# Patient Record
Sex: FEMALE | Race: WHITE | URBAN - METROPOLITAN AREA
[De-identification: names, ages, dates, MRNs, and addresses within clinical notes are randomized per-mention and may not be internally consistent; named-entity substitution may affect disease eponyms.]

---

## 2022-11-10 LAB
HEP B, EXTERNAL RESULT: NEGATIVE
RUBELLA TITER, EXTERNAL RESULT: NORMAL

## 2022-11-11 LAB
ABO, EXTERNAL RESULT: NORMAL
HEPATITIS C ANTIBODY, EXTERNAL RESULT: NEGATIVE
HIV, EXTERNAL RESULT: NON REACTIVE
RPR, EXTERNAL RESULT: NON REACTIVE

## 2023-05-24 LAB — GBS, EXTERNAL RESULT: NEGATIVE

## 2023-05-25 ENCOUNTER — HOSPITAL ENCOUNTER (INPATIENT)
Facility: HOSPITAL | Age: 28
LOS: 4 days | Discharge: HOME | End: 2023-05-29
Attending: OBSTETRICS & GYNECOLOGY | Admitting: OBSTETRICS & GYNECOLOGY

## 2023-05-25 PROBLEM — K83.1 CHOLESTASIS: Status: ACTIVE | Noted: 2023-05-25

## 2023-05-25 LAB
ALBUMIN SERPL-MCNC: 2.7 G/DL (ref 3.5–5)
ALBUMIN/GLOB SERPL: 0.8 (ref 1.1–2.2)
ALP SERPL-CCNC: 301 U/L (ref 45–117)
ALT SERPL-CCNC: 167 U/L (ref 12–78)
ANION GAP SERPL CALC-SCNC: 9 MMOL/L (ref 5–15)
APPEARANCE UR: CLEAR
AST SERPL-CCNC: 120 U/L (ref 15–37)
BACTERIA URNS QL MICRO: ABNORMAL /HPF
BILIRUB SERPL-MCNC: 0.8 MG/DL (ref 0.2–1)
BILIRUB UR QL: NEGATIVE
BUN SERPL-MCNC: 6 MG/DL (ref 6–20)
BUN/CREAT SERPL: 7 (ref 12–20)
CALCIUM SERPL-MCNC: 8.8 MG/DL (ref 8.5–10.1)
CHLORIDE SERPL-SCNC: 110 MMOL/L (ref 97–108)
CO2 SERPL-SCNC: 19 MMOL/L (ref 21–32)
COLOR UR: ABNORMAL
CREAT SERPL-MCNC: 0.84 MG/DL (ref 0.55–1.02)
CREAT UR-MCNC: 13.2 MG/DL
EPITH CASTS URNS QL MICRO: ABNORMAL /LPF
ERYTHROCYTE [DISTWIDTH] IN BLOOD BY AUTOMATED COUNT: 11.8 % (ref 11.5–14.5)
GLOBULIN SER CALC-MCNC: 3.5 G/DL (ref 2–4)
GLUCOSE SERPL-MCNC: 87 MG/DL (ref 65–100)
GLUCOSE UR STRIP.AUTO-MCNC: NEGATIVE MG/DL
HCT VFR BLD AUTO: 36.3 % (ref 35–47)
HGB BLD-MCNC: 11.8 G/DL (ref 11.5–16)
HGB UR QL STRIP: NEGATIVE
KETONES UR QL STRIP.AUTO: NEGATIVE MG/DL
LEUKOCYTE ESTERASE UR QL STRIP.AUTO: ABNORMAL
MCH RBC QN AUTO: 27.4 PG (ref 26–34)
MCHC RBC AUTO-ENTMCNC: 32.5 G/DL (ref 30–36.5)
MCV RBC AUTO: 84.2 FL (ref 80–99)
NITRITE UR QL STRIP.AUTO: NEGATIVE
NRBC # BLD: 0 K/UL (ref 0–0.01)
NRBC BLD-RTO: 0 PER 100 WBC
PH UR STRIP: 6.5 (ref 5–8)
PLATELET # BLD AUTO: 224 K/UL (ref 150–400)
POTASSIUM SERPL-SCNC: 3.9 MMOL/L (ref 3.5–5.1)
PROT SERPL-MCNC: 6.2 G/DL (ref 6.4–8.2)
PROT UR STRIP-MCNC: NEGATIVE MG/DL
PROT UR-MCNC: 6 MG/DL (ref 0–11.9)
PROT/CREAT UR-RTO: 0.5
RBC # BLD AUTO: 4.31 M/UL (ref 3.8–5.2)
RBC #/AREA URNS HPF: ABNORMAL /HPF (ref 0–5)
SODIUM SERPL-SCNC: 138 MMOL/L (ref 136–145)
SP GR UR REFRACTOMETRY: <1.005 (ref 1–1.03)
URINE CULTURE IF INDICATED: ABNORMAL
UROBILINOGEN UR QL STRIP.AUTO: 0.2 EU/DL (ref 0.2–1)
WBC # BLD AUTO: 8.9 K/UL (ref 3.6–11)
WBC URNS QL MICRO: ABNORMAL /HPF (ref 0–4)

## 2023-05-25 PROCEDURE — 3E033VJ INTRODUCTION OF OTHER HORMONE INTO PERIPHERAL VEIN, PERCUTANEOUS APPROACH: ICD-10-PCS | Performed by: OBSTETRICS & GYNECOLOGY

## 2023-05-25 PROCEDURE — 82570 ASSAY OF URINE CREATININE: CPT

## 2023-05-25 PROCEDURE — 4500000201 HC EMERGENCY DEPT VISIT NO LEVEL OF CARE

## 2023-05-25 PROCEDURE — 85027 COMPLETE CBC AUTOMATED: CPT

## 2023-05-25 PROCEDURE — 1100000000 HC RM PRIVATE

## 2023-05-25 PROCEDURE — 6360000002 HC RX W HCPCS: Performed by: OBSTETRICS & GYNECOLOGY

## 2023-05-25 PROCEDURE — 80053 COMPREHEN METABOLIC PANEL: CPT

## 2023-05-25 PROCEDURE — 4A1HXCZ MONITORING OF PRODUCTS OF CONCEPTION, CARDIAC RATE, EXTERNAL APPROACH: ICD-10-PCS | Performed by: OBSTETRICS & GYNECOLOGY

## 2023-05-25 PROCEDURE — 99283 EMERGENCY DEPT VISIT LOW MDM: CPT

## 2023-05-25 PROCEDURE — 10907ZC DRAINAGE OF AMNIOTIC FLUID, THERAPEUTIC FROM PRODUCTS OF CONCEPTION, VIA NATURAL OR ARTIFICIAL OPENING: ICD-10-PCS | Performed by: OBSTETRICS & GYNECOLOGY

## 2023-05-25 PROCEDURE — 6370000000 HC RX 637 (ALT 250 FOR IP): Performed by: OBSTETRICS & GYNECOLOGY

## 2023-05-25 PROCEDURE — 84156 ASSAY OF PROTEIN URINE: CPT

## 2023-05-25 PROCEDURE — 81001 URINALYSIS AUTO W/SCOPE: CPT

## 2023-05-25 PROCEDURE — 36415 COLL VENOUS BLD VENIPUNCTURE: CPT

## 2023-05-25 RX ORDER — METHYLERGONOVINE MALEATE 0.2 MG/ML
200 INJECTION INTRAVENOUS PRN
Status: DISCONTINUED | OUTPATIENT
Start: 2023-05-25 | End: 2023-05-27

## 2023-05-25 RX ORDER — SODIUM CHLORIDE 0.9 % (FLUSH) 0.9 %
5-40 SYRINGE (ML) INJECTION EVERY 12 HOURS SCHEDULED
Status: DISCONTINUED | OUTPATIENT
Start: 2023-05-25 | End: 2023-05-27

## 2023-05-25 RX ORDER — SODIUM CHLORIDE 9 MG/ML
25 INJECTION, SOLUTION INTRAVENOUS PRN
Status: DISCONTINUED | OUTPATIENT
Start: 2023-05-25 | End: 2023-05-27

## 2023-05-25 RX ORDER — SODIUM CHLORIDE, SODIUM LACTATE, POTASSIUM CHLORIDE, AND CALCIUM CHLORIDE .6; .31; .03; .02 G/100ML; G/100ML; G/100ML; G/100ML
500 INJECTION, SOLUTION INTRAVENOUS PRN
Status: DISCONTINUED | OUTPATIENT
Start: 2023-05-25 | End: 2023-05-27

## 2023-05-25 RX ORDER — HYDROMORPHONE HYDROCHLORIDE 2 MG/ML
2 INJECTION, SOLUTION INTRAMUSCULAR; INTRAVENOUS; SUBCUTANEOUS EVERY 4 HOURS PRN
Status: DISCONTINUED | OUTPATIENT
Start: 2023-05-25 | End: 2023-05-27

## 2023-05-25 RX ORDER — SODIUM CHLORIDE, SODIUM LACTATE, POTASSIUM CHLORIDE, AND CALCIUM CHLORIDE .6; .31; .03; .02 G/100ML; G/100ML; G/100ML; G/100ML
1000 INJECTION, SOLUTION INTRAVENOUS PRN
Status: DISCONTINUED | OUTPATIENT
Start: 2023-05-25 | End: 2023-05-27

## 2023-05-25 RX ORDER — MISOPROSTOL 200 UG/1
800 TABLET ORAL PRN
Status: DISCONTINUED | OUTPATIENT
Start: 2023-05-25 | End: 2023-05-27

## 2023-05-25 RX ORDER — SODIUM CHLORIDE 0.9 % (FLUSH) 0.9 %
5-40 SYRINGE (ML) INJECTION PRN
Status: DISCONTINUED | OUTPATIENT
Start: 2023-05-25 | End: 2023-05-27

## 2023-05-25 RX ORDER — FENTANYL CITRATE 50 UG/ML
50 INJECTION, SOLUTION INTRAMUSCULAR; INTRAVENOUS
Status: DISCONTINUED | OUTPATIENT
Start: 2023-05-25 | End: 2023-05-27

## 2023-05-25 RX ORDER — CARBOPROST TROMETHAMINE 250 UG/ML
250 INJECTION, SOLUTION INTRAMUSCULAR PRN
Status: DISCONTINUED | OUTPATIENT
Start: 2023-05-25 | End: 2023-05-27

## 2023-05-25 RX ORDER — BETAMETHASONE SODIUM PHOSPHATE AND BETAMETHASONE ACETATE 3; 3 MG/ML; MG/ML
12 INJECTION, SUSPENSION INTRA-ARTICULAR; INTRALESIONAL; INTRAMUSCULAR; SOFT TISSUE DAILY
Status: COMPLETED | OUTPATIENT
Start: 2023-05-25 | End: 2023-05-26

## 2023-05-25 RX ORDER — URSODIOL 300 MG/1
300 CAPSULE ORAL 2 TIMES DAILY
Status: ON HOLD | COMMUNITY
End: 2023-05-29 | Stop reason: HOSPADM

## 2023-05-25 RX ORDER — ZOLPIDEM TARTRATE 5 MG/1
5 TABLET ORAL NIGHTLY PRN
Status: DISCONTINUED | OUTPATIENT
Start: 2023-05-25 | End: 2023-05-27

## 2023-05-25 RX ORDER — ONDANSETRON 2 MG/ML
4 INJECTION INTRAMUSCULAR; INTRAVENOUS EVERY 6 HOURS PRN
Status: DISCONTINUED | OUTPATIENT
Start: 2023-05-25 | End: 2023-05-27

## 2023-05-25 RX ORDER — SEVOFLURANE 250 ML/250ML
1 LIQUID RESPIRATORY (INHALATION) CONTINUOUS PRN
Status: DISCONTINUED | OUTPATIENT
Start: 2023-05-25 | End: 2023-05-27

## 2023-05-25 RX ORDER — SODIUM CHLORIDE, SODIUM LACTATE, POTASSIUM CHLORIDE, CALCIUM CHLORIDE 600; 310; 30; 20 MG/100ML; MG/100ML; MG/100ML; MG/100ML
INJECTION, SOLUTION INTRAVENOUS CONTINUOUS
Status: DISCONTINUED | OUTPATIENT
Start: 2023-05-25 | End: 2023-05-27

## 2023-05-25 RX ADMIN — HYDROMORPHONE HYDROCHLORIDE 2 MG: 2 INJECTION, SOLUTION INTRAMUSCULAR; INTRAVENOUS; SUBCUTANEOUS at 22:20

## 2023-05-25 RX ADMIN — BETAMETHASONE SODIUM PHOSPHATE AND BETAMETHASONE ACETATE 12 MG: 3; 3 INJECTION, SUSPENSION INTRA-ARTICULAR; INTRALESIONAL; INTRAMUSCULAR at 16:59

## 2023-05-25 RX ADMIN — ZOLPIDEM TARTRATE 5 MG: 5 TABLET ORAL at 22:20

## 2023-05-25 ASSESSMENT — PAIN DESCRIPTION - LOCATION: LOCATION: ABDOMEN

## 2023-05-25 ASSESSMENT — PAIN SCALES - GENERAL: PAINLEVEL_OUTOF10: 5

## 2023-05-26 ENCOUNTER — ANESTHESIA (OUTPATIENT)
Facility: HOSPITAL | Age: 28
End: 2023-05-26

## 2023-05-26 ENCOUNTER — ANESTHESIA EVENT (OUTPATIENT)
Facility: HOSPITAL | Age: 28
End: 2023-05-26

## 2023-05-26 LAB
ALBUMIN SERPL-MCNC: 3.2 G/DL (ref 3.5–5)
ALBUMIN/GLOB SERPL: 0.7 (ref 1.1–2.2)
ALP SERPL-CCNC: 378 U/L (ref 45–117)
ALT SERPL-CCNC: 246 U/L (ref 12–78)
ANION GAP SERPL CALC-SCNC: 12 MMOL/L (ref 5–15)
AST SERPL-CCNC: 177 U/L (ref 15–37)
BILIRUB SERPL-MCNC: 1.3 MG/DL (ref 0.2–1)
BUN SERPL-MCNC: 10 MG/DL (ref 6–20)
BUN/CREAT SERPL: 9 (ref 12–20)
CALCIUM SERPL-MCNC: 10 MG/DL (ref 8.5–10.1)
CHLORIDE SERPL-SCNC: 103 MMOL/L (ref 97–108)
CO2 SERPL-SCNC: 19 MMOL/L (ref 21–32)
CREAT SERPL-MCNC: 1.09 MG/DL (ref 0.55–1.02)
ERYTHROCYTE [DISTWIDTH] IN BLOOD BY AUTOMATED COUNT: 11.9 % (ref 11.5–14.5)
GLOBULIN SER CALC-MCNC: 4.5 G/DL (ref 2–4)
GLUCOSE SERPL-MCNC: 102 MG/DL (ref 65–100)
HCT VFR BLD AUTO: 42.6 % (ref 35–47)
HGB BLD-MCNC: 13.8 G/DL (ref 11.5–16)
MCH RBC QN AUTO: 27.1 PG (ref 26–34)
MCHC RBC AUTO-ENTMCNC: 32.4 G/DL (ref 30–36.5)
MCV RBC AUTO: 83.7 FL (ref 80–99)
NRBC # BLD: 0 K/UL (ref 0–0.01)
NRBC BLD-RTO: 0 PER 100 WBC
PLATELET # BLD AUTO: 287 K/UL (ref 150–400)
PMV BLD AUTO: 13 FL (ref 8.9–12.9)
POTASSIUM SERPL-SCNC: 4.5 MMOL/L (ref 3.5–5.1)
PROT SERPL-MCNC: 7.7 G/DL (ref 6.4–8.2)
RBC # BLD AUTO: 5.09 M/UL (ref 3.8–5.2)
SODIUM SERPL-SCNC: 134 MMOL/L (ref 136–145)
WBC # BLD AUTO: 14.9 K/UL (ref 3.6–11)

## 2023-05-26 PROCEDURE — 6360000002 HC RX W HCPCS: Performed by: ANESTHESIOLOGY

## 2023-05-26 PROCEDURE — 85027 COMPLETE CBC AUTOMATED: CPT

## 2023-05-26 PROCEDURE — 2500000003 HC RX 250 WO HCPCS: Performed by: OBSTETRICS & GYNECOLOGY

## 2023-05-26 PROCEDURE — 2580000003 HC RX 258

## 2023-05-26 PROCEDURE — 6360000002 HC RX W HCPCS

## 2023-05-26 PROCEDURE — 2500000003 HC RX 250 WO HCPCS

## 2023-05-26 PROCEDURE — 2580000003 HC RX 258: Performed by: OBSTETRICS & GYNECOLOGY

## 2023-05-26 PROCEDURE — 6370000000 HC RX 637 (ALT 250 FOR IP): Performed by: OBSTETRICS & GYNECOLOGY

## 2023-05-26 PROCEDURE — 6360000002 HC RX W HCPCS: Performed by: OBSTETRICS & GYNECOLOGY

## 2023-05-26 PROCEDURE — A4216 STERILE WATER/SALINE, 10 ML: HCPCS | Performed by: OBSTETRICS & GYNECOLOGY

## 2023-05-26 PROCEDURE — 3700000025 EPIDURAL BLOCK: Performed by: STUDENT IN AN ORGANIZED HEALTH CARE EDUCATION/TRAINING PROGRAM

## 2023-05-26 PROCEDURE — 36415 COLL VENOUS BLD VENIPUNCTURE: CPT

## 2023-05-26 PROCEDURE — 1100000000 HC RM PRIVATE

## 2023-05-26 PROCEDURE — 80053 COMPREHEN METABOLIC PANEL: CPT

## 2023-05-26 PROCEDURE — 2500000003 HC RX 250 WO HCPCS: Performed by: ANESTHESIOLOGY

## 2023-05-26 PROCEDURE — 0KQM0ZZ REPAIR PERINEUM MUSCLE, OPEN APPROACH: ICD-10-PCS | Performed by: OBSTETRICS & GYNECOLOGY

## 2023-05-26 RX ORDER — BUPIVACAINE HYDROCHLORIDE 2.5 MG/ML
INJECTION, SOLUTION EPIDURAL; INFILTRATION; INTRACAUDAL PRN
Status: DISCONTINUED | OUTPATIENT
Start: 2023-05-26 | End: 2023-05-27 | Stop reason: SDUPTHER

## 2023-05-26 RX ORDER — NALOXONE HYDROCHLORIDE 0.4 MG/ML
INJECTION, SOLUTION INTRAMUSCULAR; INTRAVENOUS; SUBCUTANEOUS PRN
Status: DISCONTINUED | OUTPATIENT
Start: 2023-05-26 | End: 2023-05-27

## 2023-05-26 RX ORDER — FENTANYL 0.2 MG/100ML-BUPIV 0.125%-NACL 0.9% EPIDURAL INJ 2/0.125 MCG/ML-%
SOLUTION INJECTION
Status: COMPLETED
Start: 2023-05-26 | End: 2023-05-26

## 2023-05-26 RX ORDER — FENTANYL 0.2 MG/100ML-BUPIV 0.125%-NACL 0.9% EPIDURAL INJ 2/0.125 MCG/ML-%
1-15 SOLUTION INJECTION CONTINUOUS
Status: DISCONTINUED | OUTPATIENT
Start: 2023-05-26 | End: 2023-05-27

## 2023-05-26 RX ORDER — EPHEDRINE SULFATE 50 MG/ML
INJECTION INTRAVENOUS
Status: DISCONTINUED
Start: 2023-05-26 | End: 2023-05-27 | Stop reason: WASHOUT

## 2023-05-26 RX ORDER — ONDANSETRON 2 MG/ML
4 INJECTION INTRAMUSCULAR; INTRAVENOUS EVERY 6 HOURS PRN
Status: DISCONTINUED | OUTPATIENT
Start: 2023-05-26 | End: 2023-05-27

## 2023-05-26 RX ORDER — DIPHENHYDRAMINE HYDROCHLORIDE 50 MG/ML
12.5 INJECTION INTRAMUSCULAR; INTRAVENOUS EVERY 4 HOURS PRN
Status: DISCONTINUED | OUTPATIENT
Start: 2023-05-26 | End: 2023-05-27

## 2023-05-26 RX ADMIN — SODIUM CHLORIDE, POTASSIUM CHLORIDE, SODIUM LACTATE AND CALCIUM CHLORIDE: 600; 310; 30; 20 INJECTION, SOLUTION INTRAVENOUS at 21:40

## 2023-05-26 RX ADMIN — BETAMETHASONE SODIUM PHOSPHATE AND BETAMETHASONE ACETATE 12 MG: 3; 3 INJECTION, SUSPENSION INTRA-ARTICULAR; INTRALESIONAL; INTRAMUSCULAR at 17:08

## 2023-05-26 RX ADMIN — SODIUM CHLORIDE, POTASSIUM CHLORIDE, SODIUM LACTATE AND CALCIUM CHLORIDE 1000 ML: 600; 310; 30; 20 INJECTION, SOLUTION INTRAVENOUS at 17:25

## 2023-05-26 RX ADMIN — ONDANSETRON 4 MG: 2 INJECTION INTRAMUSCULAR; INTRAVENOUS at 23:50

## 2023-05-26 RX ADMIN — FAMOTIDINE 20 MG: 10 INJECTION, SOLUTION INTRAVENOUS at 23:50

## 2023-05-26 RX ADMIN — BUPIVACAINE HYDROCHLORIDE 8 ML: 2.5 INJECTION, SOLUTION EPIDURAL; INFILTRATION; INTRACAUDAL; PERINEURAL at 18:09

## 2023-05-26 RX ADMIN — OXYTOCIN 1 MILLI-UNITS/MIN: 10 INJECTION, SOLUTION INTRAMUSCULAR; INTRAVENOUS at 21:36

## 2023-05-26 RX ADMIN — Medication 25 MCG: at 11:07

## 2023-05-26 RX ADMIN — HYDROMORPHONE HYDROCHLORIDE 2 MG: 2 INJECTION, SOLUTION INTRAMUSCULAR; INTRAVENOUS; SUBCUTANEOUS at 02:00

## 2023-05-26 RX ADMIN — FENTANYL CITRATE 10 ML/HR: 0.05 INJECTION, SOLUTION INTRAMUSCULAR; INTRAVENOUS at 18:11

## 2023-05-26 RX ADMIN — Medication 50 MCG: at 06:57

## 2023-05-26 RX ADMIN — FENTANYL 0.2 MG/100ML-BUPIV 0.125%-NACL 0.9% EPIDURAL INJ 10 ML/HR: 2/0.125 SOLUTION at 18:11

## 2023-05-26 ASSESSMENT — PAIN SCALES - GENERAL: PAINLEVEL_OUTOF10: 5

## 2023-05-26 NOTE — ANESTHESIA PRE PROCEDURE
Department of Anesthesiology  Preprocedure Note       Name:  Augustina Santana   Age:  28 y.o.  :  1995                                          MRN:  [id number]         Date:  2023      Surgeon: * No surgeons listed *    Procedure: * No procedures listed *    Medications prior to admission:   Prior to Admission medications    Medication Sig Start Date End Date Taking?  Authorizing Provider   ursodiol (ACTIGALL) 300 MG capsule Take 1 capsule by mouth 2 times daily   Yes Historical Provider, MD   Prenatal Multivit-Min-Fe-FA (PRENATAL 1 + IRON PO) Take by mouth   Yes Historical Provider, MD       Current medications:    Current Facility-Administered Medications   Medication Dose Route Frequency Provider Last Rate Last Admin    oxytocin (PITOCIN) 30 units in 500 mL infusion  1-20 karlee-units/min IntraVENous Continuous Any Lisa MD        lactated ringers IV soln infusion   IntraVENous Continuous Trinity Moise MD        lactated ringers bolus  500 mL IntraVENous PRN Trinity Moise MD        Or    lactated ringers bolus  1,000 mL IntraVENous PRN Trinity Moise MD        sodium chloride flush 0.9 % injection 5-40 mL  5-40 mL IntraVENous 2 times per day Trinity Moise MD        sodium chloride flush 0.9 % injection 5-40 mL  5-40 mL IntraVENous PRN Trinity Moise MD        0.9 % sodium chloride infusion  25 mL IntraVENous PRN Trinity Moise MD        methylergonovine (METHERGINE) injection 200 mcg  200 mcg IntraMUSCular PRN Trinity Moise MD        carboprost (HEMABATE) injection 250 mcg  250 mcg IntraMUSCular PRN Trinity Moise MD        miSOPROStol (CYTOTEC) tablet 800 mcg  800 mcg Rectal PRN Trinity Moise MD        oxytocin (PITOCIN) 30 units in 500 mL infusion  87.3 karlee-units/min IntraVENous Continuous PRN Trinity Moise MD        And    oxytocin (PITOCIN) 10 unit bolus from the bag  10 Units IntraVENous PRN Trinity Moise MD        ondansetron [facility]) injection 4 mg

## 2023-05-26 NOTE — ANESTHESIA PROCEDURE NOTES
Epidural Block    Patient location during procedure: OB  Start time: 5/26/2023 5:55 PM  End time: 5/26/2023 6:05 PM  Reason for block: labor epidural  Staffing  Performed: anesthesiologist   Anesthesiologist: Silvina Gabriel MD  Epidural  Patient position: left lateral decubitus  Prep: DuraPrep  Patient monitoring: cardiac monitor, continuous pulse ox and frequent blood pressure checks  Approach: midline  Location: L3-4  Injection technique: LYNDSAY air  Provider prep: mask and sterile gloves  Needle  Needle type: Tuohy   Needle gauge: 17 G  Needle length: 3.5 in  Needle insertion depth: 7 cm  Catheter type: end hole  Catheter size: 25 G  Catheter at skin depth: 12 cmCatheter Secured: tegaderm and tape  Assessment  Sensory level: T6  Events: None  Hemodynamics: stable  Attempts: 1  Outcomes: uncomplicated and patient tolerated procedure well  Preanesthetic Checklist  Completed: patient identified, IV checked, site marked, risks and benefits discussed, surgical/procedural consents, equipment checked, pre-op evaluation, timeout performed, anesthesia consent given, oxygen available, monitors applied/VS acknowledged and fire risk safety assessment completed and verbalized

## 2023-05-27 LAB
ALBUMIN SERPL-MCNC: 2.3 G/DL (ref 3.5–5)
ALBUMIN/GLOB SERPL: 0.5 (ref 1.1–2.2)
ALP SERPL-CCNC: 257 U/L (ref 45–117)
ALT SERPL-CCNC: 290 U/L (ref 12–78)
ANION GAP SERPL CALC-SCNC: 9 MMOL/L (ref 5–15)
AST SERPL-CCNC: 225 U/L (ref 15–37)
BILIRUB SERPL-MCNC: 0.7 MG/DL (ref 0.2–1)
BUN SERPL-MCNC: 13 MG/DL (ref 6–20)
BUN/CREAT SERPL: 9 (ref 12–20)
CALCIUM SERPL-MCNC: 9.1 MG/DL (ref 8.5–10.1)
CHLORIDE SERPL-SCNC: 108 MMOL/L (ref 97–108)
CO2 SERPL-SCNC: 22 MMOL/L (ref 21–32)
CREAT SERPL-MCNC: 1.37 MG/DL (ref 0.55–1.02)
GLOBULIN SER CALC-MCNC: 4.3 G/DL (ref 2–4)
GLUCOSE SERPL-MCNC: 126 MG/DL (ref 65–100)
POTASSIUM SERPL-SCNC: 4 MMOL/L (ref 3.5–5.1)
PROT SERPL-MCNC: 6.6 G/DL (ref 6.4–8.2)
SODIUM SERPL-SCNC: 139 MMOL/L (ref 136–145)

## 2023-05-27 PROCEDURE — 80053 COMPREHEN METABOLIC PANEL: CPT

## 2023-05-27 PROCEDURE — 6370000000 HC RX 637 (ALT 250 FOR IP): Performed by: OBSTETRICS & GYNECOLOGY

## 2023-05-27 PROCEDURE — 36415 COLL VENOUS BLD VENIPUNCTURE: CPT

## 2023-05-27 PROCEDURE — 7100000000 HC PACU RECOVERY - FIRST 15 MIN

## 2023-05-27 PROCEDURE — 3700000156 HC EPIDURAL ANESTHESIA

## 2023-05-27 PROCEDURE — 1120000000 HC RM PRIVATE OB

## 2023-05-27 PROCEDURE — 7210000100 HC LABOR FEE PER 1 HR

## 2023-05-27 PROCEDURE — 7220000101 HC DELIVERY VAGINAL/SINGLE

## 2023-05-27 RX ORDER — SODIUM CHLORIDE 0.9 % (FLUSH) 0.9 %
5-40 SYRINGE (ML) INJECTION EVERY 12 HOURS SCHEDULED
Status: DISCONTINUED | OUTPATIENT
Start: 2023-05-27 | End: 2023-05-29 | Stop reason: HOSPADM

## 2023-05-27 RX ORDER — SODIUM CHLORIDE 0.9 % (FLUSH) 0.9 %
5-40 SYRINGE (ML) INJECTION PRN
Status: DISCONTINUED | OUTPATIENT
Start: 2023-05-27 | End: 2023-05-29 | Stop reason: HOSPADM

## 2023-05-27 RX ORDER — SODIUM CHLORIDE, SODIUM LACTATE, POTASSIUM CHLORIDE, CALCIUM CHLORIDE 600; 310; 30; 20 MG/100ML; MG/100ML; MG/100ML; MG/100ML
INJECTION, SOLUTION INTRAVENOUS CONTINUOUS
Status: DISCONTINUED | OUTPATIENT
Start: 2023-05-27 | End: 2023-05-29 | Stop reason: HOSPADM

## 2023-05-27 RX ORDER — DOCUSATE SODIUM 100 MG/1
100 CAPSULE, LIQUID FILLED ORAL 2 TIMES DAILY
Status: DISCONTINUED | OUTPATIENT
Start: 2023-05-27 | End: 2023-05-29 | Stop reason: HOSPADM

## 2023-05-27 RX ORDER — MODIFIED LANOLIN
OINTMENT (GRAM) TOPICAL PRN
Status: DISCONTINUED | OUTPATIENT
Start: 2023-05-27 | End: 2023-05-29 | Stop reason: HOSPADM

## 2023-05-27 RX ORDER — SODIUM CHLORIDE 9 MG/ML
INJECTION, SOLUTION INTRAVENOUS PRN
Status: DISCONTINUED | OUTPATIENT
Start: 2023-05-27 | End: 2023-05-29 | Stop reason: HOSPADM

## 2023-05-27 RX ORDER — OXYCODONE HYDROCHLORIDE 5 MG/1
10 TABLET ORAL EVERY 4 HOURS PRN
Status: DISCONTINUED | OUTPATIENT
Start: 2023-05-27 | End: 2023-05-29 | Stop reason: HOSPADM

## 2023-05-27 RX ORDER — OXYCODONE HYDROCHLORIDE 5 MG/1
5 TABLET ORAL EVERY 4 HOURS PRN
Status: DISCONTINUED | OUTPATIENT
Start: 2023-05-27 | End: 2023-05-29 | Stop reason: HOSPADM

## 2023-05-27 RX ORDER — ACETAMINOPHEN 500 MG
1000 TABLET ORAL EVERY 8 HOURS SCHEDULED
Status: DISCONTINUED | OUTPATIENT
Start: 2023-05-27 | End: 2023-05-29 | Stop reason: HOSPADM

## 2023-05-27 RX ORDER — IBUPROFEN 400 MG/1
800 TABLET ORAL EVERY 8 HOURS SCHEDULED
Status: DISCONTINUED | OUTPATIENT
Start: 2023-05-27 | End: 2023-05-29 | Stop reason: HOSPADM

## 2023-05-27 RX ADMIN — DOCUSATE SODIUM 100 MG: 100 CAPSULE, LIQUID FILLED ORAL at 22:29

## 2023-05-27 RX ADMIN — Medication 909 ML: at 01:24

## 2023-05-27 RX ADMIN — ACETAMINOPHEN 1000 MG: 500 TABLET ORAL at 17:17

## 2023-05-27 RX ADMIN — IBUPROFEN 800 MG: 400 TABLET, FILM COATED ORAL at 19:51

## 2023-05-27 RX ADMIN — IBUPROFEN 800 MG: 400 TABLET, FILM COATED ORAL at 12:06

## 2023-05-27 RX ADMIN — IBUPROFEN 800 MG: 400 TABLET, FILM COATED ORAL at 02:58

## 2023-05-27 RX ADMIN — DOCUSATE SODIUM 100 MG: 100 CAPSULE, LIQUID FILLED ORAL at 08:46

## 2023-05-27 RX ADMIN — ACETAMINOPHEN 1000 MG: 500 TABLET ORAL at 08:46

## 2023-05-27 ASSESSMENT — PAIN SCALES - GENERAL
PAINLEVEL_OUTOF10: 2
PAINLEVEL_OUTOF10: 0
PAINLEVEL_OUTOF10: 3
PAINLEVEL_OUTOF10: 2

## 2023-05-27 ASSESSMENT — PAIN DESCRIPTION - LOCATION
LOCATION: PERINEUM

## 2023-05-27 ASSESSMENT — PAIN DESCRIPTION - DESCRIPTORS
DESCRIPTORS: DULL;SORE
DESCRIPTORS: SORE
DESCRIPTORS: SORE

## 2023-05-27 ASSESSMENT — PAIN DESCRIPTION - ORIENTATION: ORIENTATION: LOWER

## 2023-05-27 NOTE — ANESTHESIA POSTPROCEDURE EVALUATION
Department of Anesthesiology  Postprocedure Note    Patient: Annel Armenta  MRN: [id number]  YOB: 1995  Date of evaluation: 5/27/2023      Procedure Summary     Date: 05/26/23 Room / Location:     Anesthesia Start: 1755 Anesthesia Stop: 05/27/23 0118    Procedure: Labor Analgesia Diagnosis:     Scheduled Providers:  Responsible Provider: Rae Quevedo DO    Anesthesia Type: epidural ASA Status: 2          Anesthesia Type: No value filed.     Jaquelin Phase I:      Jaquelin Phase II:        Anesthesia Post Evaluation    Patient location during evaluation: bedside  Patient participation: complete - patient participated  Level of consciousness: awake  Pain score: 0  Airway patency: patent  Nausea & Vomiting: no nausea and no vomiting  Complications: no  Cardiovascular status: blood pressure returned to baseline  Respiratory status: acceptable  Hydration status: euvolemic  Comments: /65   Pulse 79   Temp 98.9 °F (37.2 °C) (Axillary)   Resp 18   Ht 1.626 m (5' 4\")   Wt 92.1 kg (203 lb)   SpO2 99%   BMI 34.84 kg/m²

## 2023-05-28 LAB
ALBUMIN SERPL-MCNC: 2.2 G/DL (ref 3.5–5)
ALBUMIN/GLOB SERPL: 0.6 (ref 1.1–2.2)
ALP SERPL-CCNC: 208 U/L (ref 45–117)
ALT SERPL-CCNC: 182 U/L (ref 12–78)
ANION GAP SERPL CALC-SCNC: 6 MMOL/L (ref 5–15)
AST SERPL-CCNC: 90 U/L (ref 15–37)
BILIRUB SERPL-MCNC: 0.3 MG/DL (ref 0.2–1)
BUN SERPL-MCNC: 11 MG/DL (ref 6–20)
BUN/CREAT SERPL: 11 (ref 12–20)
CALCIUM SERPL-MCNC: 8.5 MG/DL (ref 8.5–10.1)
CHLORIDE SERPL-SCNC: 109 MMOL/L (ref 97–108)
CO2 SERPL-SCNC: 25 MMOL/L (ref 21–32)
CREAT SERPL-MCNC: 1.01 MG/DL (ref 0.55–1.02)
GLOBULIN SER CALC-MCNC: 3.8 G/DL (ref 2–4)
GLUCOSE SERPL-MCNC: 88 MG/DL (ref 65–100)
POTASSIUM SERPL-SCNC: 3.6 MMOL/L (ref 3.5–5.1)
PROT SERPL-MCNC: 6 G/DL (ref 6.4–8.2)
SODIUM SERPL-SCNC: 140 MMOL/L (ref 136–145)

## 2023-05-28 PROCEDURE — 80053 COMPREHEN METABOLIC PANEL: CPT

## 2023-05-28 PROCEDURE — 36415 COLL VENOUS BLD VENIPUNCTURE: CPT

## 2023-05-28 PROCEDURE — 6370000000 HC RX 637 (ALT 250 FOR IP): Performed by: OBSTETRICS & GYNECOLOGY

## 2023-05-28 PROCEDURE — 1120000000 HC RM PRIVATE OB

## 2023-05-28 RX ADMIN — DOCUSATE SODIUM 100 MG: 100 CAPSULE, LIQUID FILLED ORAL at 09:09

## 2023-05-28 RX ADMIN — ACETAMINOPHEN 1000 MG: 500 TABLET ORAL at 09:09

## 2023-05-28 RX ADMIN — IBUPROFEN 800 MG: 400 TABLET, FILM COATED ORAL at 12:28

## 2023-05-28 RX ADMIN — IBUPROFEN 800 MG: 400 TABLET, FILM COATED ORAL at 20:27

## 2023-05-28 RX ADMIN — ACETAMINOPHEN 1000 MG: 500 TABLET ORAL at 18:13

## 2023-05-28 RX ADMIN — IBUPROFEN 800 MG: 400 TABLET, FILM COATED ORAL at 03:48

## 2023-05-28 RX ADMIN — DOCUSATE SODIUM 100 MG: 100 CAPSULE, LIQUID FILLED ORAL at 20:27

## 2023-05-28 ASSESSMENT — PAIN DESCRIPTION - LOCATION
LOCATION: PERINEUM
LOCATION: PERINEUM
LOCATION: ABDOMEN
LOCATION: PERINEUM

## 2023-05-28 ASSESSMENT — PAIN SCALES - GENERAL
PAINLEVEL_OUTOF10: 2
PAINLEVEL_OUTOF10: 3
PAINLEVEL_OUTOF10: 3
PAINLEVEL_OUTOF10: 2

## 2023-05-28 ASSESSMENT — PAIN DESCRIPTION - ORIENTATION
ORIENTATION: LOWER
ORIENTATION: LOWER

## 2023-05-28 ASSESSMENT — PAIN DESCRIPTION - DESCRIPTORS
DESCRIPTORS: SORE
DESCRIPTORS: CRAMPING
DESCRIPTORS: SORE
DESCRIPTORS: DISCOMFORT

## 2023-05-29 VITALS
HEIGHT: 64 IN | BODY MASS INDEX: 34.66 KG/M2 | OXYGEN SATURATION: 100 % | TEMPERATURE: 98 F | DIASTOLIC BLOOD PRESSURE: 79 MMHG | WEIGHT: 203 LBS | SYSTOLIC BLOOD PRESSURE: 119 MMHG | HEART RATE: 72 BPM | RESPIRATION RATE: 15 BRPM

## 2023-05-29 PROBLEM — O14.90 PREECLAMPSIA: Status: ACTIVE | Noted: 2023-05-29

## 2023-05-29 PROCEDURE — 6370000000 HC RX 637 (ALT 250 FOR IP): Performed by: OBSTETRICS & GYNECOLOGY

## 2023-05-29 RX ORDER — IBUPROFEN 600 MG/1
600 TABLET ORAL EVERY 6 HOURS PRN
Qty: 40 TABLET | Refills: 0 | Status: SHIPPED | OUTPATIENT
Start: 2023-05-29

## 2023-05-29 RX ADMIN — DOCUSATE SODIUM 100 MG: 100 CAPSULE, LIQUID FILLED ORAL at 11:05

## 2023-05-29 RX ADMIN — ACETAMINOPHEN 1000 MG: 500 TABLET ORAL at 02:11

## 2023-05-29 RX ADMIN — ACETAMINOPHEN 1000 MG: 500 TABLET ORAL at 11:03

## 2023-05-29 RX ADMIN — IBUPROFEN 800 MG: 400 TABLET, FILM COATED ORAL at 04:13

## 2023-05-29 ASSESSMENT — PAIN DESCRIPTION - ORIENTATION: ORIENTATION: LOWER

## 2023-05-29 ASSESSMENT — PAIN DESCRIPTION - LOCATION
LOCATION: PERINEUM
LOCATION: PERINEUM

## 2023-05-29 ASSESSMENT — PAIN SCALES - GENERAL
PAINLEVEL_OUTOF10: 2
PAINLEVEL_OUTOF10: 5

## 2023-05-29 ASSESSMENT — PAIN DESCRIPTION - DESCRIPTORS
DESCRIPTORS: SORE
DESCRIPTORS: DISCOMFORT;SORE

## 2023-05-29 ASSESSMENT — PAIN - FUNCTIONAL ASSESSMENT: PAIN_FUNCTIONAL_ASSESSMENT: ACTIVITIES ARE NOT PREVENTED

## 2023-05-29 NOTE — PLAN OF CARE
Problem: Pain  Goal: Verbalizes/displays adequate comfort level or baseline comfort level  2023 1357 by Lupe Felton RN  Outcome: Adequate for Discharge  2023 0925 by Lupe Felton RN  Outcome: Progressing     Problem: Skin/Tissue Integrity  Goal: Absence of new skin breakdown  Description: 1. Monitor for areas of redness and/or skin breakdown  2. Assess vascular access sites hourly  3. Every 4-6 hours minimum:  Change oxygen saturation probe site  4. Every 4-6 hours:  If on nasal continuous positive airway pressure, respiratory therapy assess nares and determine need for appliance change or resting period. 2023 1357 by Lupe Felton RN  Outcome: Adequate for Discharge  2023 3337 by Lupe Felton RN  Outcome: Progressing     Fundus firm, lochia rubra scant. Voiding. Passing flatus. Breastfeeding and supplementing  with EBM. Pain controlled on current regimen. IV removed. CBC WNL. EPDS WNL. Ambulating independently. Discharge instructions provided and patient verbalized understanding.

## 2023-05-29 NOTE — LACTATION NOTE
This note was copied from a baby's chart. Mom states the baby has been nursing well. Her bili during the night was 16. Baby was placed under phototherapy during the night. I set mom up with the breast pump and showed her how to use it. I recommended that mom supplement after nursing. Mom agreed to donor milk. Mom pumped and collected 16 cc's of her own milk. I showed parents how to syringe feed the baby. Mom will continue to breast feed every 3 hours. She will limit the time baby is out from under the light. She can supplement under the light. Mom will call out as needed for assistance.

## 2023-05-29 NOTE — DISCHARGE SUMMARY
Obstetrical Discharge Summary     Name: Danie Sanz MRN: [id number]  SSN: [id number]    YOB: 1995  Age: 28 y.o. Sex: female      Admit Date: 2023    Discharge Date: 2023     Admitting Physician: Axel Quinones MD     Attending Physician:  Axel Quinones MD     Admission Diagnoses: Cholestasis [K83.1]  PreEclampsia    Discharge Diagnoses:   Information for the patient's :  [facility] [[id number]]   @[phone]@    Cholestasis  PreEclampsia   vaginal delivery s/p induction of labor for PreE and cholestasis with elevated liver enzymes and creatinine      Hospital Course: Normal hospital course following the delivery. Her labs started improving postpartum day 1, but had not yet normalized. Blood pressures were normal without meds. Patient Instructions:   Current Discharge Medication List        START taking these medications    Details   ibuprofen (ADVIL;MOTRIN) 600 MG tablet Take 1 tablet by mouth every 6 hours as needed for Pain  Qty: 40 tablet, Refills: 0           CONTINUE these medications which have NOT CHANGED    Details   Prenatal Multivit-Min-Fe-FA (PRENATAL 1 + IRON PO) Take by mouth           STOP taking these medications       ursodiol (ACTIGALL) 300 MG capsule Comments:   Reason for Stopping:               Disposition at Discharge: Home or self care    Condition at Discharge: Stable    Reference my discharge instructions.     Follow-up: one week for BP check and CMP    Signed By:  Adeline Snow MD     May 29, 2023

## 2023-05-29 NOTE — PROGRESS NOTES
Post-Partum Day Number 2 Progress Note    Mehdi Evans     Assessment: Doing well, post partum day 2     Cholestasis: labs improved significantly yesterday. Clinically resolving. Plan one week follow-up for labs. PreEclampsia: normotensive, no meds. Creatinine normalized yesterday. Encouraged continued PO hydration. Plan one week follow-up for BP check. Plan:   - Discharge home today  - Follow up in office in 1 week(s) with [facility].  - Pain medication prescription(s) sent. - Questions answered. Information for the patient's :  Arlet Riggs  [address] [[id number]]   Vaginal, Spontaneous      Subjective:  Patient doing well without significant complaint. Voiding without difficulty, normal lochia. Ready for discharge home. Vitals:  /79   Pulse 64   Temp 98.4 °F (36.9 °C) (Oral)   Resp 16   Ht 1.626 m (5' 4\")   Wt 92.1 kg (203 lb)   LMP 2022 (Exact Date)   SpO2 97%   Breastfeeding Unknown   BMI 34.84 kg/m²   Temp (24hrs), Av.9 °F (36.6 °C), Min:97.4 °F (36.3 °C), Max:98.4 °F (36.9 °C)      Exam:        Patient without distress.                 Fundus firm, nontender per nursing fundal checks                Perineum with normal lochia noted per nursing assessment                Lower extremities are negative for pathological edema    Labs:     Lab Results   Component Value Date/Time    WBC 14.9 2023 10:30 AM    WBC 8.9 2023 11:08 AM    HGB 13.8 2023 10:30 AM    HGB 11.8 2023 11:08 AM    HCT 42.6 2023 10:30 AM    HCT 36.3 2023 11:08 AM     2023 10:30 AM     2023 11:08 AM       Recent Results (from the past 24 hour(s))   Comprehensive Metabolic Panel    Collection Time: 23 11:05 AM   Result Value Ref Range    Sodium 140 136 - 145 mmol/L    Potassium 3.6 3.5 - 5.1 mmol/L    Chloride 109 (H) 97 - 108 mmol/L    CO2 25 21 - 32 mmol/L    Anion Gap 6 5 - 15 mmol/L    Glucose 88 65 - 100 mg/dL

## 2024-12-18 LAB
C. TRACHOMATIS, EXTERNAL RESULT: NEGATIVE
HEP B, EXTERNAL RESULT: NON REACTIVE
HEPATITIS C ANTIBODY, EXTERNAL RESULT: NON REACTIVE
HIV, EXTERNAL RESULT: NON REACTIVE
N. GONORRHOEAE, EXTERNAL RESULT: NEGATIVE
RUBELLA TITER, EXTERNAL RESULT: NORMAL
T. PALLIDUM (SYPHILIS) ANTIBODY, EXTERNAL RESULT: NON REACTIVE

## 2025-06-28 LAB — GBS, EXTERNAL RESULT: NEGATIVE

## 2025-07-09 ENCOUNTER — HOSPITAL ENCOUNTER (INPATIENT)
Facility: HOSPITAL | Age: 30
LOS: 2 days | Discharge: HOME | End: 2025-07-11
Attending: STUDENT IN AN ORGANIZED HEALTH CARE EDUCATION/TRAINING PROGRAM | Admitting: STUDENT IN AN ORGANIZED HEALTH CARE EDUCATION/TRAINING PROGRAM
Payer: COMMERCIAL

## 2025-07-09 ENCOUNTER — ANESTHESIA EVENT (OUTPATIENT)
Facility: HOSPITAL | Age: 30
End: 2025-07-09
Payer: COMMERCIAL

## 2025-07-09 ENCOUNTER — ANESTHESIA (OUTPATIENT)
Facility: HOSPITAL | Age: 30
End: 2025-07-09
Payer: COMMERCIAL

## 2025-07-09 PROBLEM — O26.643 CHOLESTASIS DURING PREGNANCY IN THIRD TRIMESTER: Status: ACTIVE | Noted: 2025-07-09

## 2025-07-09 LAB
ABO + RH BLD: NORMAL
ALBUMIN SERPL-MCNC: 2.7 G/DL (ref 3.5–5)
ALBUMIN/GLOB SERPL: 0.7 (ref 1.1–2.2)
ALP SERPL-CCNC: 222 U/L (ref 45–117)
ALT SERPL-CCNC: 66 U/L (ref 12–78)
ANION GAP SERPL CALC-SCNC: 8 MMOL/L (ref 2–12)
AST SERPL-CCNC: 40 U/L (ref 15–37)
BILIRUB SERPL-MCNC: 0.8 MG/DL (ref 0.2–1)
BLOOD GROUP ANTIBODIES SERPL: NORMAL
BUN SERPL-MCNC: 7 MG/DL (ref 6–20)
BUN/CREAT SERPL: 8 (ref 12–20)
CALCIUM SERPL-MCNC: 9.6 MG/DL (ref 8.5–10.1)
CHLORIDE SERPL-SCNC: 107 MMOL/L (ref 97–108)
CO2 SERPL-SCNC: 21 MMOL/L (ref 21–32)
CREAT SERPL-MCNC: 0.86 MG/DL (ref 0.55–1.02)
CREAT UR-MCNC: 25.4 MG/DL
ERYTHROCYTE [DISTWIDTH] IN BLOOD BY AUTOMATED COUNT: 11.9 % (ref 11.5–14.5)
GLOBULIN SER CALC-MCNC: 3.9 G/DL (ref 2–4)
GLUCOSE SERPL-MCNC: 115 MG/DL (ref 65–100)
HCT VFR BLD AUTO: 37.5 % (ref 35–47)
HGB BLD-MCNC: 12.8 G/DL (ref 11.5–16)
MCH RBC QN AUTO: 27.8 PG (ref 26–34)
MCHC RBC AUTO-ENTMCNC: 34.1 G/DL (ref 30–36.5)
MCV RBC AUTO: 81.5 FL (ref 80–99)
NRBC # BLD: 0 K/UL (ref 0–0.01)
NRBC BLD-RTO: 0 PER 100 WBC
PLATELET # BLD AUTO: 239 K/UL (ref 150–400)
PMV BLD AUTO: 12.2 FL (ref 8.9–12.9)
POTASSIUM SERPL-SCNC: 4 MMOL/L (ref 3.5–5.1)
PROT SERPL-MCNC: 6.6 G/DL (ref 6.4–8.2)
PROT UR-MCNC: 8 MG/DL (ref 0–11.9)
PROT/CREAT UR-RTO: 0.3
RBC # BLD AUTO: 4.6 M/UL (ref 3.8–5.2)
RPR SER QL: NONREACTIVE
SODIUM SERPL-SCNC: 136 MMOL/L (ref 136–145)
SPECIMEN EXP DATE BLD: NORMAL
WBC # BLD AUTO: 11.8 K/UL (ref 3.6–11)

## 2025-07-09 PROCEDURE — 1100000000 HC RM PRIVATE

## 2025-07-09 PROCEDURE — 6370000000 HC RX 637 (ALT 250 FOR IP): Performed by: STUDENT IN AN ORGANIZED HEALTH CARE EDUCATION/TRAINING PROGRAM

## 2025-07-09 PROCEDURE — 6360000002 HC RX W HCPCS: Performed by: STUDENT IN AN ORGANIZED HEALTH CARE EDUCATION/TRAINING PROGRAM

## 2025-07-09 PROCEDURE — 86900 BLOOD TYPING SEROLOGIC ABO: CPT

## 2025-07-09 PROCEDURE — 86901 BLOOD TYPING SEROLOGIC RH(D): CPT

## 2025-07-09 PROCEDURE — 6360000002 HC RX W HCPCS: Performed by: ANESTHESIOLOGY

## 2025-07-09 PROCEDURE — 3700000025 EPIDURAL BLOCK: Performed by: SURGERY

## 2025-07-09 PROCEDURE — 85027 COMPLETE CBC AUTOMATED: CPT

## 2025-07-09 PROCEDURE — 6360000002 HC RX W HCPCS: Performed by: SURGERY

## 2025-07-09 PROCEDURE — 86850 RBC ANTIBODY SCREEN: CPT

## 2025-07-09 PROCEDURE — 82570 ASSAY OF URINE CREATININE: CPT

## 2025-07-09 PROCEDURE — 84156 ASSAY OF PROTEIN URINE: CPT

## 2025-07-09 PROCEDURE — 2580000003 HC RX 258: Performed by: STUDENT IN AN ORGANIZED HEALTH CARE EDUCATION/TRAINING PROGRAM

## 2025-07-09 PROCEDURE — 80053 COMPREHEN METABOLIC PANEL: CPT

## 2025-07-09 PROCEDURE — 7210000100 HC LABOR FEE PER 1 HR

## 2025-07-09 PROCEDURE — 86592 SYPHILIS TEST NON-TREP QUAL: CPT

## 2025-07-09 PROCEDURE — 2500000003 HC RX 250 WO HCPCS: Performed by: ANESTHESIOLOGY

## 2025-07-09 RX ORDER — LIDOCAINE HYDROCHLORIDE AND EPINEPHRINE 20; 5 MG/ML; UG/ML
INJECTION, SOLUTION EPIDURAL; INFILTRATION; INTRACAUDAL; PERINEURAL
Status: DISCONTINUED | OUTPATIENT
Start: 2025-07-09 | End: 2025-07-10 | Stop reason: SDUPTHER

## 2025-07-09 RX ORDER — LOPERAMIDE HYDROCHLORIDE 2 MG/1
2 CAPSULE ORAL PRN
Status: DISCONTINUED | OUTPATIENT
Start: 2025-07-09 | End: 2025-07-10 | Stop reason: SDUPTHER

## 2025-07-09 RX ORDER — SODIUM CHLORIDE, SODIUM LACTATE, POTASSIUM CHLORIDE, AND CALCIUM CHLORIDE .6; .31; .03; .02 G/100ML; G/100ML; G/100ML; G/100ML
500 INJECTION, SOLUTION INTRAVENOUS PRN
Status: DISCONTINUED | OUTPATIENT
Start: 2025-07-09 | End: 2025-07-11 | Stop reason: HOSPADM

## 2025-07-09 RX ORDER — SODIUM CHLORIDE 0.9 % (FLUSH) 0.9 %
5-40 SYRINGE (ML) INJECTION PRN
Status: DISCONTINUED | OUTPATIENT
Start: 2025-07-09 | End: 2025-07-10 | Stop reason: SDUPTHER

## 2025-07-09 RX ORDER — NALBUPHINE HYDROCHLORIDE 10 MG/ML
5 INJECTION INTRAMUSCULAR; INTRAVENOUS; SUBCUTANEOUS EVERY 4 HOURS PRN
Status: DISCONTINUED | OUTPATIENT
Start: 2025-07-09 | End: 2025-07-11 | Stop reason: HOSPADM

## 2025-07-09 RX ORDER — EPHEDRINE SULFATE 50 MG/ML
10 INJECTION INTRAVENOUS
Status: DISCONTINUED | OUTPATIENT
Start: 2025-07-09 | End: 2025-07-11 | Stop reason: HOSPADM

## 2025-07-09 RX ORDER — CALCIUM CARBONATE 500 MG/1
500 TABLET, CHEWABLE ORAL 3 TIMES DAILY PRN
Status: DISCONTINUED | OUTPATIENT
Start: 2025-07-09 | End: 2025-07-11 | Stop reason: HOSPADM

## 2025-07-09 RX ORDER — FENTANYL CITRATE 50 UG/ML
INJECTION, SOLUTION INTRAMUSCULAR; INTRAVENOUS
Status: DISCONTINUED | OUTPATIENT
Start: 2025-07-09 | End: 2025-07-10 | Stop reason: SDUPTHER

## 2025-07-09 RX ORDER — FENTANYL CITRATE 50 UG/ML
INJECTION, SOLUTION INTRAMUSCULAR; INTRAVENOUS
Status: COMPLETED
Start: 2025-07-09 | End: 2025-07-09

## 2025-07-09 RX ORDER — BUPIVACAINE HYDROCHLORIDE 2.5 MG/ML
INJECTION, SOLUTION EPIDURAL; INFILTRATION; INTRACAUDAL; PERINEURAL
Status: DISCONTINUED | OUTPATIENT
Start: 2025-07-09 | End: 2025-07-10 | Stop reason: SDUPTHER

## 2025-07-09 RX ORDER — SODIUM CHLORIDE 9 MG/ML
25 INJECTION, SOLUTION INTRAVENOUS PRN
Status: DISCONTINUED | OUTPATIENT
Start: 2025-07-09 | End: 2025-07-10 | Stop reason: SDUPTHER

## 2025-07-09 RX ORDER — LIDOCAINE HYDROCHLORIDE AND EPINEPHRINE 20; 5 MG/ML; UG/ML
INJECTION, SOLUTION EPIDURAL; INFILTRATION; INTRACAUDAL; PERINEURAL
Status: COMPLETED
Start: 2025-07-09 | End: 2025-07-09

## 2025-07-09 RX ORDER — METHYLERGONOVINE MALEATE 0.2 MG/ML
200 INJECTION INTRAVENOUS PRN
Status: DISCONTINUED | OUTPATIENT
Start: 2025-07-09 | End: 2025-07-11 | Stop reason: HOSPADM

## 2025-07-09 RX ORDER — SODIUM CHLORIDE 0.9 % (FLUSH) 0.9 %
5-40 SYRINGE (ML) INJECTION EVERY 12 HOURS SCHEDULED
Status: DISCONTINUED | OUTPATIENT
Start: 2025-07-09 | End: 2025-07-10 | Stop reason: SDUPTHER

## 2025-07-09 RX ORDER — SODIUM CHLORIDE, SODIUM LACTATE, POTASSIUM CHLORIDE, CALCIUM CHLORIDE 600; 310; 30; 20 MG/100ML; MG/100ML; MG/100ML; MG/100ML
INJECTION, SOLUTION INTRAVENOUS CONTINUOUS
Status: DISCONTINUED | OUTPATIENT
Start: 2025-07-09 | End: 2025-07-11 | Stop reason: HOSPADM

## 2025-07-09 RX ORDER — CARBOPROST TROMETHAMINE 250 UG/ML
250 INJECTION, SOLUTION INTRAMUSCULAR PRN
Status: DISCONTINUED | OUTPATIENT
Start: 2025-07-09 | End: 2025-07-11 | Stop reason: HOSPADM

## 2025-07-09 RX ORDER — CALCIUM CARBONATE 500 MG/1
1 TABLET, CHEWABLE ORAL DAILY
COMMUNITY

## 2025-07-09 RX ORDER — ACETAMINOPHEN 325 MG/1
650 TABLET ORAL EVERY 4 HOURS PRN
Status: DISCONTINUED | OUTPATIENT
Start: 2025-07-09 | End: 2025-07-10 | Stop reason: SDUPTHER

## 2025-07-09 RX ORDER — TERBUTALINE SULFATE 1 MG/ML
0.25 INJECTION SUBCUTANEOUS
Status: DISCONTINUED | OUTPATIENT
Start: 2025-07-09 | End: 2025-07-11 | Stop reason: HOSPADM

## 2025-07-09 RX ORDER — MISOPROSTOL 200 UG/1
400 TABLET ORAL PRN
Status: DISCONTINUED | OUTPATIENT
Start: 2025-07-09 | End: 2025-07-11 | Stop reason: HOSPADM

## 2025-07-09 RX ORDER — FENTANYL/BUPIVACAINE/NS/PF 2-1250MCG
1-15 PLASTIC BAG, INJECTION (ML) INJECTION CONTINUOUS
Refills: 0 | Status: DISCONTINUED | OUTPATIENT
Start: 2025-07-09 | End: 2025-07-11 | Stop reason: HOSPADM

## 2025-07-09 RX ORDER — ONDANSETRON 2 MG/ML
4 INJECTION INTRAMUSCULAR; INTRAVENOUS EVERY 6 HOURS PRN
Status: DISCONTINUED | OUTPATIENT
Start: 2025-07-09 | End: 2025-07-10 | Stop reason: SDUPTHER

## 2025-07-09 RX ORDER — ONDANSETRON 4 MG/1
4 TABLET, ORALLY DISINTEGRATING ORAL EVERY 6 HOURS PRN
Status: DISCONTINUED | OUTPATIENT
Start: 2025-07-09 | End: 2025-07-10 | Stop reason: SDUPTHER

## 2025-07-09 RX ORDER — BUPIVACAINE HYDROCHLORIDE 2.5 MG/ML
INJECTION, SOLUTION EPIDURAL; INFILTRATION; INTRACAUDAL; PERINEURAL
Status: COMPLETED
Start: 2025-07-09 | End: 2025-07-09

## 2025-07-09 RX ORDER — ASPIRIN 81 MG/1
81 TABLET, CHEWABLE ORAL DAILY
Status: ON HOLD | COMMUNITY
End: 2025-07-11 | Stop reason: HOSPADM

## 2025-07-09 RX ADMIN — LIDOCAINE HYDROCHLORIDE,EPINEPHRINE BITARTRATE 3 ML: 20; .005 INJECTION, SOLUTION EPIDURAL; INFILTRATION; INTRACAUDAL; PERINEURAL at 19:21

## 2025-07-09 RX ADMIN — BUPIVACAINE HYDROCHLORIDE 2 ML: 2.5 INJECTION, SOLUTION EPIDURAL; INFILTRATION; INTRACAUDAL; PERINEURAL at 19:22

## 2025-07-09 RX ADMIN — FENTANYL CITRATE 100 MCG: 50 INJECTION INTRAMUSCULAR; INTRAVENOUS at 19:22

## 2025-07-09 RX ADMIN — SODIUM CHLORIDE, SODIUM LACTATE, POTASSIUM CHLORIDE, AND CALCIUM CHLORIDE: .6; .31; .03; .02 INJECTION, SOLUTION INTRAVENOUS at 16:03

## 2025-07-09 RX ADMIN — Medication 10 ML/HR: at 19:30

## 2025-07-09 RX ADMIN — CALCIUM CARBONATE (ANTACID) CHEW TAB 500 MG 500 MG: 500 CHEW TAB at 19:53

## 2025-07-09 RX ADMIN — ONDANSETRON 4 MG: 2 INJECTION, SOLUTION INTRAMUSCULAR; INTRAVENOUS at 22:24

## 2025-07-09 RX ADMIN — OXYTOCIN 1 MILLI-UNITS/MIN: 10 INJECTION, SOLUTION INTRAMUSCULAR; INTRAVENOUS at 16:07

## 2025-07-09 NOTE — PROGRESS NOTES
2025  7:26 AM -  patient of Dr Cruz here for induction of labor. Was 4 cm in the office yesterday. Unsure of epidural at this time. Plan to assess after provider makes plan of care for today.    0833 - Patient up to bathroom    0920 - Dr Rivera at bedside, discussing plan of care. Plan to AROM and monitor progression of labor. Patient agreeable to plan of care.    0930 - SVE 5/70/-2 and AROM. Clear fluid.    1030 - Patient up and ambulating in the room    1130 - Patient on birthing ball.    1245 - Patient ambulating in room. RN at bedside, demonstrating various positions to patient    1400 - RN at bedside, patient coping. Ambulating in room    1550 - Verbal order received from Dr Rivera to start pitocin.    1600 - RN at bedside, discussing plan of care. Patient agreeable to starting pitocin.     1607 - Pitocin started at I karlee-unit/min    1745 - Dr Rivera at bedside, SVE 6/80/-2. Plan to continue titrating pitocin    1846 - Patient requesting epidural. IV bolus initiated    - Dr Godoy at bedside to place epidural     - Bedside shift change report given to GILMAR Celestin RN (oncoming nurse) by MIREYA Sethi RN (offgoing nurse). Report included the following information Nurse Handoff Report.

## 2025-07-09 NOTE — ANESTHESIA PRE PROCEDURE
injection 200 mcg  200 mcg IntraMUSCular PRN Rupinder Rivera MD        carboprost (HEMABATE) injection 250 mcg  250 mcg IntraMUSCular PRN Rupinder Rivera MD        miSOPROStol (CYTOTEC) tablet 400 mcg  400 mcg Buccal PRN Rupinder Rivera MD        tranexamic acid (CYKLOKAPRON) 1,000 mg in sodium chloride 0.9 % 110 mL IVPB (addEASE)  1,000 mg IntraVENous Once PRN Rupinder Rivera MD        loperamide (IMODIUM) capsule 2 mg  2 mg Oral PRN Rupinder Rivera MD        acetaminophen (TYLENOL) tablet 650 mg  650 mg Oral Q4H PRN Rupinder Rivera MD        benzocaine-menthol (DERMOPLAST) 20-0.5 % spray   Topical PRN Rupinder Rivera MD        oxytocin (PITOCIN) 30 units in 500 mL infusion  1-20 karlee-units/min IntraVENous Continuous Rupinder Rivera MD 8 mL/hr at 07/09/25 1833 8 karlee-units/min at 07/09/25 1833    fentaNYL 2 mcg/mL BUPivacaine 0.125% in sodium chloride 0.9% 100 mL epidural infusion  1-15 mL/hr Epidural Continuous Casey Stinson MD        naloxone 0.4 mg in 10 mL sodium chloride syringe   IntraVENous PRN Casey Stinson MD        ePHEDrine injection 10 mg  10 mg IntraVENous Once PRN Casey Stinson MD        nalbuphine (NUBAIN) injection 5 mg  5 mg IntraVENous Q4H PRN Casey Stinson MD        ondansetron (ZOFRAN) injection 4 mg  4 mg IntraVENous Q6H PRN Casey Stinson MD           Allergies:    Allergies   Allergen Reactions    Pcn [Penicillins] Hives       Problem List:    Patient Active Problem List   Diagnosis Code    Cholestasis (HCC) K83.1    Preeclampsia O14.90    Cholestasis during pregnancy in third trimester O26.643       Past Medical History:        Diagnosis Date    Gallstone     Preeclampsia 5/29/2023    Preeclampsia 5/29/2023       Past Surgical History:        Procedure Laterality Date    APPENDECTOMY         Social History:    Social History     Tobacco Use    Smoking status: Never     Passive exposure: Never    Smokeless tobacco: Never   Substance Use Topics

## 2025-07-09 NOTE — ANESTHESIA PROCEDURE NOTES
Epidural Block    Patient location during procedure: OB  Start time: 7/9/2025 7:17 PM  End time: 7/9/2025 7:30 PM  Reason for block: labor epidural  Staffing  Performed: anesthesiologist   Anesthesiologist: Jose Godoy MD  Performed by: Jose Godoy MD  Authorized by: Jose Godoy MD    Epidural  Patient position: sitting  Prep: DuraPrep  Patient monitoring: cardiac monitor, continuous pulse ox and frequent blood pressure checks  Approach: midline  Location: L3-4  Injection technique: LYNDSAY saline and LYNDSAY air  Provider prep: mask and sterile gloves  Needle  Needle type: Tuohy   Needle gauge: 17 G  Needle length: 3.5 in  Needle insertion depth: 5 cm  Catheter type: end hole  Catheter size: 19 G  Catheter at skin depth: 9 cm  Test dose: negativeCatheter Secured: tegaderm and tape  Assessment  Hemodynamics: stable  Attempts: 1  Outcomes: uncomplicated and patient tolerated procedure well  Preanesthetic Checklist  Completed: patient identified, IV checked, site marked, risks and benefits discussed, surgical/procedural consents, equipment checked, pre-op evaluation, timeout performed, anesthesia consent given, oxygen available, monitors applied/VS acknowledged and fire risk safety assessment completed and verbalized

## 2025-07-09 NOTE — PROGRESS NOTES
1925 Bedside and verbal SBAR report received from ANDRE Sethi RN    1926 pt assisted to left lateral side. VS WNL.     1944 pt assisted to right exaggerated runners w/ peanut ball    2030 pt assisted to left exaggerated runners w/ peanut ball    2100 trendelenburg    2124 SHEREE Coker at bedside. Pt reports feeling intermittent pressure. EFM reviewed and SVE performed    2135 pt placed in throne position    2158 pt right side lying release    2218 Sheree Coker at bedside. EFM reviewed and SVE performed    2231 begin pushing    2245 CNM at bedside to assess. Stop pushing and labor down    2250 pt on hands and knees with cub per request    2336 pt begin pushing. RN continuously at bedside monitoring FHT    0010 SHEREE Coker at bedside. EFM reviewed continuously    0015 MD Maru at bedside. EFM reviewed    0020 O2 applied    0030 tug of war    0036 closed knee pushing    0130 Bedside and verbal SBAR report given to MARILU Schulz RN

## 2025-07-09 NOTE — H&P
Obstetrics Admission History & Physical    Name: Kelle Evans MRN: [id number]  SSN: [id number]    YOB: 1995  Age: 29 y.o.  Sex: female      Subjective:     Reason for Admission:  Pregnancy and Cholestasis of pregnancy    History of Present Illness: Kelle Evans is a 29 y.o.  female with an estimated gestational age of 37w0d with Estimated Date of Delivery: 25. She presents today for a schedule IOL in the setting of cholestasis of pregnancy. She reports ongoing pruritis of her hands and soles. Notes occasional uterine contractions, denies vaginal bleeding or leakage of fluid.     Pregnancy has been complicated by: 1) Cholestasis of pregnancy (bile acids 19.8 )  2) history of preeclampsia in P1 pregnancy  3) LGA infant measuring EFW 94th%ile (3144gm) @ 35wk  4) BMI 31.5    This is a surprise gender, has a baby girl at home.     OB History          2    Para   1    Term           1    AB        Living   1         SAB        IAB        Ectopic        Molar        Multiple   0    Live Births   1              Past Medical History:   Diagnosis Date    Gallstone     Preeclampsia 2023    Preeclampsia 2023     Past Surgical History:   Procedure Laterality Date    APPENDECTOMY       Social History     Socioeconomic History    Marital status: Single     Spouse name: Not on file    Number of children: Not on file    Years of education: Not on file    Highest education level: Not on file   Occupational History    Not on file   Tobacco Use    Smoking status: Never     Passive exposure: Never    Smokeless tobacco: Never   Substance and Sexual Activity    Alcohol use: Not Currently    Drug use: Never    Sexual activity: Yes     Partners: Male   Other Topics Concern    Not on file   Social History Narrative    Not on file     Social Drivers of Health     Financial Resource Strain: Not on file   Food Insecurity: Not on file   Transportation Needs: Not on file   Physical

## 2025-07-10 PROCEDURE — 7210000100 HC LABOR FEE PER 1 HR

## 2025-07-10 PROCEDURE — 6370000000 HC RX 637 (ALT 250 FOR IP): Performed by: ADVANCED PRACTICE MIDWIFE

## 2025-07-10 PROCEDURE — 51701 INSERT BLADDER CATHETER: CPT

## 2025-07-10 PROCEDURE — 7100000001 HC PACU RECOVERY - ADDTL 15 MIN

## 2025-07-10 PROCEDURE — 2580000003 HC RX 258: Performed by: STUDENT IN AN ORGANIZED HEALTH CARE EDUCATION/TRAINING PROGRAM

## 2025-07-10 PROCEDURE — 7220000101 HC DELIVERY VAGINAL/SINGLE

## 2025-07-10 PROCEDURE — 7100000000 HC PACU RECOVERY - FIRST 15 MIN

## 2025-07-10 PROCEDURE — 10S0XZZ REPOSITION PRODUCTS OF CONCEPTION, EXTERNAL APPROACH: ICD-10-PCS | Performed by: ADVANCED PRACTICE MIDWIFE

## 2025-07-10 PROCEDURE — 10907ZC DRAINAGE OF AMNIOTIC FLUID, THERAPEUTIC FROM PRODUCTS OF CONCEPTION, VIA NATURAL OR ARTIFICIAL OPENING: ICD-10-PCS | Performed by: ADVANCED PRACTICE MIDWIFE

## 2025-07-10 PROCEDURE — 1120000000 HC RM PRIVATE OB

## 2025-07-10 PROCEDURE — 0HQ9XZZ REPAIR PERINEUM SKIN, EXTERNAL APPROACH: ICD-10-PCS | Performed by: ADVANCED PRACTICE MIDWIFE

## 2025-07-10 RX ORDER — ONDANSETRON 2 MG/ML
4 INJECTION INTRAMUSCULAR; INTRAVENOUS EVERY 6 HOURS PRN
Status: DISCONTINUED | OUTPATIENT
Start: 2025-07-10 | End: 2025-07-11 | Stop reason: HOSPADM

## 2025-07-10 RX ORDER — LOPERAMIDE HYDROCHLORIDE 2 MG/1
2 CAPSULE ORAL PRN
Status: DISCONTINUED | OUTPATIENT
Start: 2025-07-10 | End: 2025-07-11 | Stop reason: HOSPADM

## 2025-07-10 RX ORDER — ACETAMINOPHEN 500 MG
1000 TABLET ORAL EVERY 8 HOURS SCHEDULED
Status: DISCONTINUED | OUTPATIENT
Start: 2025-07-10 | End: 2025-07-11 | Stop reason: HOSPADM

## 2025-07-10 RX ORDER — SODIUM CHLORIDE 0.9 % (FLUSH) 0.9 %
5-40 SYRINGE (ML) INJECTION EVERY 12 HOURS SCHEDULED
Status: DISCONTINUED | OUTPATIENT
Start: 2025-07-10 | End: 2025-07-11 | Stop reason: HOSPADM

## 2025-07-10 RX ORDER — MODIFIED LANOLIN
OINTMENT (GRAM) TOPICAL PRN
Status: DISCONTINUED | OUTPATIENT
Start: 2025-07-10 | End: 2025-07-11 | Stop reason: HOSPADM

## 2025-07-10 RX ORDER — IBUPROFEN 400 MG/1
800 TABLET, FILM COATED ORAL EVERY 8 HOURS SCHEDULED
Status: DISCONTINUED | OUTPATIENT
Start: 2025-07-10 | End: 2025-07-11 | Stop reason: HOSPADM

## 2025-07-10 RX ORDER — SODIUM CHLORIDE 9 MG/ML
INJECTION, SOLUTION INTRAVENOUS PRN
Status: DISCONTINUED | OUTPATIENT
Start: 2025-07-10 | End: 2025-07-11 | Stop reason: HOSPADM

## 2025-07-10 RX ORDER — ONDANSETRON 4 MG/1
4 TABLET, ORALLY DISINTEGRATING ORAL EVERY 6 HOURS PRN
Status: DISCONTINUED | OUTPATIENT
Start: 2025-07-10 | End: 2025-07-11 | Stop reason: HOSPADM

## 2025-07-10 RX ORDER — SODIUM CHLORIDE 0.9 % (FLUSH) 0.9 %
5-40 SYRINGE (ML) INJECTION PRN
Status: DISCONTINUED | OUTPATIENT
Start: 2025-07-10 | End: 2025-07-11 | Stop reason: HOSPADM

## 2025-07-10 RX ORDER — ACETAMINOPHEN 500 MG
1000 TABLET ORAL EVERY 8 HOURS SCHEDULED
Status: DISCONTINUED | OUTPATIENT
Start: 2025-07-10 | End: 2025-07-10

## 2025-07-10 RX ORDER — DOCUSATE SODIUM 100 MG/1
100 CAPSULE, LIQUID FILLED ORAL 2 TIMES DAILY
Status: DISCONTINUED | OUTPATIENT
Start: 2025-07-10 | End: 2025-07-11 | Stop reason: HOSPADM

## 2025-07-10 RX ADMIN — DOCUSATE SODIUM 100 MG: 100 CAPSULE, LIQUID FILLED ORAL at 20:54

## 2025-07-10 RX ADMIN — ACETAMINOPHEN 1000 MG: 500 TABLET ORAL at 18:55

## 2025-07-10 RX ADMIN — DOCUSATE SODIUM 100 MG: 100 CAPSULE, LIQUID FILLED ORAL at 02:17

## 2025-07-10 RX ADMIN — IBUPROFEN 800 MG: 400 TABLET, FILM COATED ORAL at 14:10

## 2025-07-10 RX ADMIN — ACETAMINOPHEN 1000 MG: 500 TABLET ORAL at 02:17

## 2025-07-10 RX ADMIN — IBUPROFEN 800 MG: 400 TABLET, FILM COATED ORAL at 22:56

## 2025-07-10 RX ADMIN — SODIUM CHLORIDE, SODIUM LACTATE, POTASSIUM CHLORIDE, AND CALCIUM CHLORIDE: .6; .31; .03; .02 INJECTION, SOLUTION INTRAVENOUS at 00:54

## 2025-07-10 RX ADMIN — ACETAMINOPHEN 1000 MG: 500 TABLET ORAL at 10:39

## 2025-07-10 ASSESSMENT — PAIN DESCRIPTION - DESCRIPTORS
DESCRIPTORS: CRAMPING
DESCRIPTORS: CRAMPING

## 2025-07-10 ASSESSMENT — PAIN DESCRIPTION - LOCATION
LOCATION: ABDOMEN
LOCATION: ABDOMEN

## 2025-07-10 ASSESSMENT — PAIN - FUNCTIONAL ASSESSMENT
PAIN_FUNCTIONAL_ASSESSMENT: ACTIVITIES ARE NOT PREVENTED
PAIN_FUNCTIONAL_ASSESSMENT: ACTIVITIES ARE NOT PREVENTED

## 2025-07-10 ASSESSMENT — PAIN SCALES - GENERAL
PAINLEVEL_OUTOF10: 6
PAINLEVEL_OUTOF10: 4

## 2025-07-10 ASSESSMENT — PAIN DESCRIPTION - ORIENTATION
ORIENTATION: LOWER
ORIENTATION: LOWER

## 2025-07-10 NOTE — PROGRESS NOTES
Labor Progress Note    S: Patient seen, fetal heart rate and contraction pattern evaluated.     Physical Exam:  Patient Vitals for the past 4 hrs:   Temp Pulse Resp BP SpO2   25 2157 98.6 °F (37 °C) 100 18 129/81 98 %   25 -- 67 -- (!) 107/55 --   25 -- 97 -- (!) 119/56 --   25 -- 75 -- 114/63 --   25 -- (!) 103 -- (!) 100/59 --   25 -- 100 -- 93/60 --   25 98 °F (36.7 °C) -- 19 99/64 99 %         Cervical Exam: 10/100/+1  Membranes:  Intact  Uterine Contractions:  Frequency: Irregular  Fetal Heart Rate: Reactive      Assessment/Plan:    29 y.o.  at 37w0d IUP       P:  SVE: 10/100/+1, will begin pushing.  Anticipate vaginal delivery    LAXMI Eubanks - KAITLYN

## 2025-07-10 NOTE — PROGRESS NOTES
Bedside shift change report given to JOSÉ MIGUEL Burton RN (oncoming nurse) by GLORY Mike RN (offgoing nurse). Report included the following information Nurse Handoff Report and MAR.

## 2025-07-10 NOTE — DISCHARGE INSTRUCTIONS
Postpartum Support Groups  We know that all of us are dealing with a tremendous amount of uncertainty, confusion and disruption to our daily lives, which may result in increased anxiety, depression and fear. If you are feeling unsettled or worse, please know that we are here to help. During this time of increased caution and care for one another, [facility] [place] ([facility]) is offering virtual support groups to ALL MOTHERS in [place] regardless of the age of your child/children as a way to help weather this emotional storm together. Social support is an important part of self-care during this time of physical distancing.  Virtual postpartum support group meetings available at www.postpartumva.org  Warm Line: 148.709.1190    Breastfeeding Support Groups   1st and 3rd Wednesday of each month at [place] from 11a-12 2nd and 4th Tuesday of each month at [place] from 10-11a      [email or web address]    POST DELIVERY DISCHARGE INSTRUCTIONS    Name: Kelle Evans  YOB: 1995    General:     Diet/Diet Restrictions:  Eight 8-ounce glasses of fluid daily (water, juices); avoid excessive caffeine intake.  Meals/snacks as desired which are high in fiber and carbohydrates and low in fat and cholesterol.    Physical Activity / Restrictions / Safety:     Avoid heavy lifting, no more that 8 lbs. For 2-3 weeks;   Limit use of stairs to 2 times daily for the first week home.   No driving for one week.  Avoid intercourse 4-6 weeks, no douching or tampon use.   Check with obstetrician before starting or resuming an exercise program.      Discharge Instructions/Special Treatment/Home Care Needs:     Continue prenatal vitamins.  Continue to use squirt bottle with warm water on your episiotomy after each bathroom use until bleeding stops.  If steri-strips applied to your incision, remove in 7-10 days.    Call your doctor for the following:     Fever over 101 degrees by  mouth.  Vaginal bleeding heavier than a normal menstrual period or clots larger than a golf ball.  Red streaks or increased swelling of legs, painful red streaks on your breast.  Painful urination, constipation and increased pain or swelling or discharge with your incision.  If you feel extremely anxious or overwhelmed.  If you have thoughts of harming yourself and/or your baby.  If you develop a headache that is not improved with tylenol/ibuprofen, vision changes, chest pain, trouble breathing, pain in your right upper abdomen.   If you have a blood pressure reading greater than or equal to 160/110.     Pain Management:     Take Acetaminophen (Tylenol) or Ibuprofen (Advil, Motrin), as directed for pain.   Use a warm Sitz bath 3 times daily to relieve episiotomy or hemorrhoidal discomfort.   For hemorrhoidal discomfort, use Tucks and Anusol cream as needed and directed.  Heating pad to  incision as needed.     Follow-Up Care:     Appointment with MD: JONE in 1 week for blood pressure check and in 6 weeks for routine postpartum visit.   Telephone number: [phone]

## 2025-07-10 NOTE — ANESTHESIA POSTPROCEDURE EVALUATION
Post-Anesthesia Evaluation and Assessment    Patient: Kelle Evans MRN: [id number]  SSN: [id number]    YOB: 1995  Age: 29 y.o.  Sex: female      Patient is status post Labor Epidural.     Cardiovascular Function/Vital Signs  Visit Vitals  /61   Pulse 92   Temp 98.7 °F (37.1 °C) (Oral)   Resp 20   Ht 1.626 m (5' 4\")   Wt 90.7 kg (200 lb)   SpO2 98%   BMI 34.33 kg/m²       Complications related to anesthesia: None    Post-anesthesia assessment completed. No concerns    Signed By: Jose Godoy MD     July 10, 2025

## 2025-07-10 NOTE — PROGRESS NOTES
TRANSFER - IN REPORT:    Verbal report received from MARILU Schulz RN on Kelle Creluis  being received from Labor & Delivery for routine progression of patient care      Report consisted of patient's Situation, Background, Assessment and   Recommendations(SBAR).     Information from the following report(s) Nurse Handoff Report was reviewed with the receiving nurse.    Opportunity for questions and clarification was provided.      Assessment completed upon patient's arrival to unit and care assumed.

## 2025-07-10 NOTE — LACTATION NOTE
This note was copied from a baby's chart.  . Mother states that she nursed her first baby for 13 months with a good supply. Mother states that baby has been nursing well. Mother had baby latched during the consult. Audible swallows noted. Educated mother about feeding cues, feeding on demand, offering both breasts at every feeding, and feeding at least every 3 hours.    Feeding Plan:  Mother will keep baby skin to skin as often as possible, feed on demand, 8-12x/day , respond to feeding cues, obtain latch, listen for audible swallowing, be aware of signs of oxytocin release/ cramping,thirst,sleepiness while breastfeeding, offer both breasts,and will not limit feedings.  Mother agrees to utilize breast massage while nursing to facilitate lactogenesis.

## 2025-07-10 NOTE — PROGRESS NOTES
Post-Partum Day Number 0 Progress Note    Kelle Evans     Assessment: Doing well, post partum day 1    Plan:  - Continue routine postpartum and perineal care as well as maternal education.  - Pre-e w/o SF.  Normal Bps after delivery.  Will continue to monitor.  No s/s of pre-e w/SF  - Desires circ -- plan PPD1  - Plan discharge home PPD1/2.     Information for the patient's :  Evangelist Evans [[id number]]   Vaginal, Spontaneous Patient doing well without significant complaint.  Voiding without difficulty, normal lochia.  No HA, RUQ pain or visual changes.    Vitals:  /78   Pulse 74   Temp 97.9 °F (36.6 °C) (Oral)   Resp 16   Ht 1.626 m (5' 4\")   Wt 90.7 kg (200 lb)   SpO2 96%   BMI 34.33 kg/m²   Temp (24hrs), Av.3 °F (36.8 °C), Min:97.9 °F (36.6 °C), Max:99.3 °F (37.4 °C)        Exam:   Patient without distress.                  Fundus firm, nontender per nursing fundal checks.                Perineum with normal lochia noted per nursing assessment.                Lower extremities are negative for pathological edema.    Labs:     Lab Results   Component Value Date/Time    WBC 11.8 2025 08:21 AM    WBC 14.9 2023 10:30 AM    WBC 8.9 2023 11:08 AM    HGB 12.8 2025 08:21 AM    HGB 13.8 2023 10:30 AM    HGB 11.8 2023 11:08 AM    HCT 37.5 2025 08:21 AM    HCT 42.6 2023 10:30 AM    HCT 36.3 2023 11:08 AM     2025 08:21 AM     2023 10:30 AM     2023 11:08 AM       No results found for this or any previous visit (from the past 24 hours).

## 2025-07-10 NOTE — L&D DELIVERY NOTE
Called to patient's room for delivery. She pushed for approximately for one hour and thirty minutes. Vertex delivered with spontaneous maternal pushing efforts over supported perineum. Anterior shoulder did not deliver with gentle downward traction from CNM, shoulder dystocia recognized.  HOB flat, Marlena and Suprapubic pressure applied by RN to fetal right shoulder.  Shoulder released and shoulder and body delivered. Shoulder dystocia was 30 seconds.  Infant noted to be vigorous. Placed on maternal abdomen. Drying/tactile stimulation and bulb suction by RN staff. Delayed cord clamping. Cord double clamped by CNM and then cut by FOB. Infant skin to skin with mother. Serena placenta delivered intact. Pitocin IV per protocol. Vulva, vagina and perineum inspected and found first degree perineal laceration.  CNM recommended stitches to assist with healing, patient declined.  States that she did not feel that she had good healing after her first delivery post stitches.  CNM confirmed patient desire, patient stated again that she did not want CNM to repair laceration, CNM verbalized understanding. Jesi care completed. Mom and baby doing well, left stable and attended.      Evangelist Evans [[id number]]      Labor Events     Labor: No   Steroids: None  Cervical Ripening Date/Time:      Antibiotics Received during Labor: No  Rupture Identifier: Sac 1  Rupture Date/Time:  25 09:30:00   Rupture Type: AROM  Fluid Color: Bloody Show  Fluid Odor: None  Fluid Volume: Moderate  Induction: AROM  Augmentation: Oxytocin       Anesthesia    Method: Epidural       Labor Event Times      Labor onset date/time:  25 17:45:00     Dilation complete date/time:  25 22:20:43     Start pushing date/time:     Decision date/time (emergent ):            Delivery Details      Delivery Date: 7/10/25 Delivery Time: 01:09:00   Delivery Type: Vaginal, Spontaneous               Presentation

## 2025-07-10 NOTE — PROGRESS NOTES
Labor Progress Note    S: Patient seen, fetal heart rate and contraction pattern evaluated.     Physical Exam:  Patient Vitals for the past 4 hrs:   Temp Pulse Resp BP SpO2   25 2157 98.6 °F (37 °C) 100 18 129/81 98 %   25 -- 67 -- (!) 107/55 --   25 -- 97 -- (!) 119/56 --   25 -- 75 -- 114/63 --   25 -- (!) 103 -- (!) 100/59 --   25 -- 100 -- 93/60 --   25 1928 98 °F (36.7 °C) -- 19 99/64 99 %   25 1822 97.9 °F (36.6 °C) (!) 104 16 134/87 99 %         Cervical Exam: Lip/100/0  Membranes:  Intact  Uterine Contractions:  Frequency: Irregular  Fetal Heart Rate: Reactive      Assessment/Plan:    29 y.o.  at 37w0d IUP  1 labor  Cat 1 tracing    P:  SVE: lip/100/0, discussed laboring down with patient.  Will re-evaluate in 30-60 minutes and likely starting pushing.    LAXMI Eubanks CNM

## 2025-07-11 VITALS
WEIGHT: 200 LBS | OXYGEN SATURATION: 97 % | HEIGHT: 64 IN | HEART RATE: 65 BPM | TEMPERATURE: 97.7 F | DIASTOLIC BLOOD PRESSURE: 75 MMHG | SYSTOLIC BLOOD PRESSURE: 122 MMHG | RESPIRATION RATE: 16 BRPM | BODY MASS INDEX: 34.15 KG/M2

## 2025-07-11 PROCEDURE — 6370000000 HC RX 637 (ALT 250 FOR IP): Performed by: ADVANCED PRACTICE MIDWIFE

## 2025-07-11 RX ORDER — IBUPROFEN 800 MG/1
800 TABLET, FILM COATED ORAL EVERY 8 HOURS PRN
Qty: 60 TABLET | Refills: 0 | Status: SHIPPED | OUTPATIENT
Start: 2025-07-11

## 2025-07-11 RX ADMIN — ACETAMINOPHEN 1000 MG: 500 TABLET ORAL at 12:14

## 2025-07-11 RX ADMIN — IBUPROFEN 800 MG: 400 TABLET, FILM COATED ORAL at 15:25

## 2025-07-11 RX ADMIN — IBUPROFEN 800 MG: 400 TABLET, FILM COATED ORAL at 07:19

## 2025-07-11 RX ADMIN — DOCUSATE SODIUM 100 MG: 100 CAPSULE, LIQUID FILLED ORAL at 08:28

## 2025-07-11 RX ADMIN — ACETAMINOPHEN 1000 MG: 500 TABLET ORAL at 04:15

## 2025-07-11 ASSESSMENT — PAIN DESCRIPTION - DESCRIPTORS
DESCRIPTORS: CRAMPING
DESCRIPTORS: CRAMPING

## 2025-07-11 ASSESSMENT — PAIN DESCRIPTION - ORIENTATION
ORIENTATION: LOWER
ORIENTATION: LOWER

## 2025-07-11 ASSESSMENT — PAIN SCALES - GENERAL
PAINLEVEL_OUTOF10: 2
PAINLEVEL_OUTOF10: 5

## 2025-07-11 ASSESSMENT — PAIN DESCRIPTION - LOCATION
LOCATION: ABDOMEN
LOCATION: ABDOMEN

## 2025-07-11 NOTE — PROGRESS NOTES
0745:Bedside and Verbal shift change report given to Jose Raul KAUR (oncoming nurse) by MARILU Howard RN (offgoing nurse). Report included the following information Nurse Handoff Report.     1530: Discharge instructions reviewed with pt. All questions answered. Pt verbalizes understanding.      1530: I have reviewed and agree with the documentation done by DEWAYNE Ochoa, nurse extern.

## 2025-07-11 NOTE — PROGRESS NOTES
Bedside shift change report given to MARILU Howard RN (oncoming nurse) by MARILU Burton RN (offgoing nurse). Report included the following information Nurse Handoff Report, Intake/Output, MAR, and Recent Results.

## 2025-07-11 NOTE — DISCHARGE SUMMARY
Obstetrical Discharge Summary     Name: Kelle Evans MRN: [id number]  SSN: [id number]    YOB: 1995  Age: 29 y.o.  Sex: female      Admit Date: 2025    Discharge Date: 2025     Admitting Physician: Rupinder Rivera MD     Attending Physician:  Mariah Cruz MD     Admission Diagnoses: Cholestasis during pregnancy in third trimester [O26.643]    Discharge Diagnoses:   Information for the patient's :  Evangelist Evans [[id number]]   @[id number]@     Additional Diagnoses:  Principal Problem:    Cholestasis during pregnancy in third trimester  Active Problems:    Preeclampsia  Resolved Problems:    * No resolved hospital problems. *       Hospital Course: Patient was admitted for IOL for cholestasis. Intrapartum course was complicated by meeting criteria for preE w/o SF. Proceeded to have  @ 37w2d resulting in viable male infant, c/b 30s shoulder dystocia relieved by Marlena and suprapubic pressure. Baby boy doing well, has right ulnar fx. Please see delivery note for more details. Postpartum course has been uncomplicated. BP have remained normal. Patient is meeting postpartum milestones. Desires early discharge home PPD#1.    Patient Instructions:   Current Discharge Medication List        CONTINUE these medications which have CHANGED    Details   ibuprofen (ADVIL;MOTRIN) 800 MG tablet Take 1 tablet by mouth every 8 hours as needed for Pain  Qty: 60 tablet, Refills: 0           CONTINUE these medications which have NOT CHANGED    Details   calcium carbonate (TUMS) 500 MG chewable tablet Take 1 tablet by mouth daily      Prenatal Multivit-Min-Fe-FA (PRENATAL 1 + IRON PO) Take by mouth           STOP taking these medications       aspirin 81 MG chewable tablet Comments:   Reason for Stopping:               Disposition at Discharge: Home or self care    Condition at Discharge: Stable    Reference my discharge instructions.    Signed By:  Jyoti Rojas DO     July

## 2025-07-11 NOTE — PROGRESS NOTES
0745: Bedside and Verbal shift change report given to Jelena and Jose Raul RN (oncoming nurse) by Aman RN (offgoing nurse). Report included the following information Nurse Handoff Report.

## 2025-07-11 NOTE — PROGRESS NOTES
Post-Partum Day Number 1 Progress Note    Kelle Evans     ASSESSMENT/PLAN  Doing well, postpartum day 1 s/p TSVD c/b shoulder dystocia relieved by Marlena and suprapubic pressure, about 30s long. Baby boy doing well, right ulnar fracture seen on XR.   Stable, meeting postpartum milestones.    PreE w/o SF  -BP normal since delivery. No preE sxs.   -Plan BP check in office in 1 week     Postpartum  - Continue routine postpartum and perineal care as well as maternal education.  - Desires circumcision for baby boy. The risks and benefits of the circumcision procedure and anesthesia including: bleeding, infection, variability of cosmetic results were discussed with the mother. She is aware that future repeat procedures may be necessary. She gives informed consent to proceed as noted and her questions are answered.       Dispo: Desires early today pending baby's disposition.     Information for the patient's :  Evangelist Evans [[id number]]   Vaginal, Spontaneous     SUBJECTIVE  Patient is doing well. Pain controlled on PO medications. Tolerating general diet without nausea or emesis. Voiding without difficulty. Reports VB is lightening. Ambulating OOB without weakness or dizziness. Denies HA, vision changes, CP, dyspnea, RUQ pain, new/worsening swelling, or calf pain. Denies other complaints.     Vitals:  /75   Pulse 65   Temp 97.7 °F (36.5 °C) (Oral)   Resp 16   Ht 1.626 m (5' 4\")   Wt 90.7 kg (200 lb)   SpO2 97%   BMI 34.33 kg/m²   Temp (24hrs), Av.7 °F (36.5 °C), Min:97.5 °F (36.4 °C), Max:97.9 °F (36.6 °C)        Exam:  -General: no distress, alert and oriented  -Pulm: Effort normal.   -Abdominal: Fundus firm, nontender per nursing fundal checks.  -Ext: Lower extremities are negative for pathological edema. No calf tenderness.  -Perineum with normal lochia noted per nursing assessment.    Labs:     Lab Results   Component Value Date/Time    WBC 11.8 2025 08:21 AM    WBC 14.9  05/26/2023 10:30 AM    WBC 8.9 05/25/2023 11:08 AM    HGB 12.8 07/09/2025 08:21 AM    HGB 13.8 05/26/2023 10:30 AM    HGB 11.8 05/25/2023 11:08 AM    HCT 37.5 07/09/2025 08:21 AM    HCT 42.6 05/26/2023 10:30 AM    HCT 36.3 05/25/2023 11:08 AM     07/09/2025 08:21 AM     05/26/2023 10:30 AM     05/25/2023 11:08 AM       No results found for this or any previous visit (from the past 24 hours).    Electronically signed:  Jyoti Rojas DO  7/11/2025 1:54 PM

## 2025-07-14 VITALS
HEART RATE: 65 BPM | OXYGEN SATURATION: 97 % | HEIGHT: 64 IN | SYSTOLIC BLOOD PRESSURE: 122 MMHG | DIASTOLIC BLOOD PRESSURE: 75 MMHG | WEIGHT: 200 LBS | RESPIRATION RATE: 16 BRPM | TEMPERATURE: 97.7 F | BODY MASS INDEX: 34.15 KG/M2